# Patient Record
Sex: MALE | Race: WHITE | NOT HISPANIC OR LATINO
[De-identification: names, ages, dates, MRNs, and addresses within clinical notes are randomized per-mention and may not be internally consistent; named-entity substitution may affect disease eponyms.]

---

## 2019-08-13 PROBLEM — Z00.00 ENCOUNTER FOR PREVENTIVE HEALTH EXAMINATION: Status: ACTIVE | Noted: 2019-08-13

## 2019-08-14 ENCOUNTER — APPOINTMENT (OUTPATIENT)
Dept: CARDIOLOGY | Facility: CLINIC | Age: 54
End: 2019-08-14
Payer: COMMERCIAL

## 2019-08-14 VITALS
HEART RATE: 60 BPM | BODY MASS INDEX: 18.18 KG/M2 | WEIGHT: 127 LBS | DIASTOLIC BLOOD PRESSURE: 74 MMHG | HEIGHT: 70 IN | SYSTOLIC BLOOD PRESSURE: 124 MMHG

## 2019-08-14 DIAGNOSIS — I46.9 CARDIAC ARREST, CAUSE UNSPECIFIED: ICD-10-CM

## 2019-08-14 DIAGNOSIS — I49.8 OTHER SPECIFIED CARDIAC ARRHYTHMIAS: ICD-10-CM

## 2019-08-14 DIAGNOSIS — I42.2 OTHER HYPERTROPHIC CARDIOMYOPATHY: ICD-10-CM

## 2019-08-14 PROCEDURE — 93000 ELECTROCARDIOGRAM COMPLETE: CPT

## 2019-08-14 PROCEDURE — 99204 OFFICE O/P NEW MOD 45 MIN: CPT

## 2019-08-14 PROCEDURE — 96040: CPT

## 2019-08-15 ENCOUNTER — LABORATORY RESULT (OUTPATIENT)
Age: 54
End: 2019-08-15

## 2019-08-20 PROBLEM — I49.8 BRUGADA SYNDROME: Status: ACTIVE | Noted: 2019-08-20

## 2019-08-20 PROBLEM — I42.2 CARDIOMYOPATHY, HYPERTROPHIC: Status: ACTIVE | Noted: 2019-08-20

## 2019-08-20 PROBLEM — I46.9 CARDIAC ARREST: Status: ACTIVE | Noted: 2019-08-20

## 2019-08-20 NOTE — HISTORY OF PRESENT ILLNESS
[FreeTextEntry1] : Mr Abdias García chloe 54 yo M [facility] Brugada syndrome, HCM  and cardiac arrest.  He states he has had a murmur present since he was a child but was always an active child participating in sports without any difficulties.  in 7/2010 he had a syncopal episode, he describes it a a sudden LOC after standing, lasting seconds with quick return to his baseline.  It was witnessed by his family and he initially told the observing family member that he tripped.  He followed with a cardiologist who noticed an abnormal EKG and underwent an ECHO that demonstrated HCM with septal thickening.  He underwent stress testing and annual followup with stress testing without changes until 2018.  At that visit his EKG had changes and he was referred to an EP Dr Mercer at [facility].  He states here he was given a diagnosis of brugada syndrome.  He then underwent an EP study and had a cardiac arrest during the study, he was successfully resuscitated and had an ICD placed.  \par Today he presents for a cardiogenomic evaluation.

## 2019-08-20 NOTE — REASON FOR VISIT
[Family Member] : family member [FreeTextEntry3] : JYOTHI DANIEL  is being seen  for an initial consultation at the Cardiogenomics Program at [facility] on 08/14/2019.   Mr. DANIEL was referred by His cardiologist at [facility] Dr Mercer for hereditary cardiac predisposition risk assessment and counseling, due to his history of HCM, Brugada syndrome and cardiac arrest.

## 2019-08-20 NOTE — REVIEW OF SYSTEMS
[Nl] : Neurological [NI] : Endocrine [Wgt Loss (___ lbs)] : no recent weight loss [Wgt Gain (___ Lbs)] : no recent weight gain [Rash] : no rash [Birth Marks] : no birth marks [Muscle Weakness] : no muscle weakness [Eczema] : no eczema [Subluxation] : no subluxation was seen [Joint Dislocation] : no dislocation [Muscle Pain] : no muscle pain [Cyanosis] : no cyanosis [Edema] : no ~T edema [Chest Pain] : no chest pain [Diaphoresis] : no diaphoresis [Palpitations] : palpitations [Exercise Intolerance] : no exercise intolerance

## 2019-08-20 NOTE — PHYSICAL EXAM
[Alert] : alert [Active] : active [In no acute distress] :  in no acute distress [Well developed] : well developed [Interested in people and surroundings] : interested in people and surroundings [Well nourished] : well nourished [Normal] : orientated to person, place, and time [de-identified] : regular 2/6 fransisca, no change with valsalva [de-identified] : no edema [de-identified] : 5/5/ UARISTEO and ADDIE

## 2019-08-20 NOTE — FAMILY HISTORY
[FreeTextEntry1] : FamilyHistory_20_twCiteListControlStart FamilyHistory_20_twCiteListControlEnd [id number] \par A four-generation family history was constructed and scanned into [employer]. \par Family history is significant for: \par Both his maternal and paternal families originate from [place]\par No Ashkenazi [employer] ancestry. Family history was positive/negative for consanguinity  No family history of SIDS\par  \par \par

## 2019-10-02 ENCOUNTER — APPOINTMENT (OUTPATIENT)
Dept: CARDIOLOGY | Facility: CLINIC | Age: 54
End: 2019-10-02
Payer: COMMERCIAL

## 2019-10-02 VITALS — HEART RATE: 78 BPM | SYSTOLIC BLOOD PRESSURE: 157 MMHG | DIASTOLIC BLOOD PRESSURE: 82 MMHG | OXYGEN SATURATION: 98 %

## 2019-10-02 VITALS — SYSTOLIC BLOOD PRESSURE: 138 MMHG | DIASTOLIC BLOOD PRESSURE: 92 MMHG

## 2019-10-02 PROCEDURE — 96040: CPT

## 2019-10-02 PROCEDURE — 99214 OFFICE O/P EST MOD 30 MIN: CPT

## 2019-10-09 NOTE — PHYSICAL EXAM
[Alert] : alert [Active] : active [In no acute distress] :  in no acute distress [Well developed] : well developed [Well nourished] : well nourished [Interested in people and surroundings] : interested in people and surroundings [Normal] : orientated to person, place, and time [de-identified] : regular 2/6 fransisca, no change with valsalva [de-identified] : no edema [de-identified] : 5/5/ UARISTEO and ADDIE

## 2019-10-09 NOTE — HISTORY OF PRESENT ILLNESS
[FreeTextEntry1] : Mr Abdias García chloe 54 yo M [facility] Brugada syndrome, HCM  and cardiac arrest.  He states he has had a murmur present since he was a child but was always an active child participating in sports without any difficulties.  in 7/2010 he had a syncopal episode, he describes it a a sudden LOC after standing, lasting seconds with quick return to his baseline.  It was witnessed by his family and he initially told the observing family member that he tripped.  He followed with a cardiologist who noticed an abnormal EKG and underwent an ECHO that demonstrated HCM with septal thickening.  He underwent stress testing and annual followup with stress testing without changes until 2018.  At that visit his EKG had changes and he was referred to an EP Dr Mercer at [facility].  He states here he was given a diagnosis of brugada syndrome.  He then underwent an EP study and had a cardiac arrest during the study, he was successfully resuscitated and had an ICD placed.  \par \par He underwent a genetic evaluation with the  combined cardiac sequencing and Del/Dup panel 138 genes (#745)  \par  today he presents for results

## 2019-10-09 NOTE — REASON FOR VISIT
[Follow-Up] : [unfilled]  is being seen for  ~M a follow-up visit [Family Member] : family member [FreeTextEntry3] : Mr. JYOTHI DANIEL  is a 54 year  year-old man initially seen at the Cardiogenomics Program at [facility] on 8/14/19 due to his diagnosis of HCM , Brugada syndrome and cardiac arrest.  He underwent genetic testing and today on 10/02/2019 presents  for a discussion regarding genetic testing results\par He  was accompanied by his brother\par

## 2019-10-09 NOTE — REVIEW OF SYSTEMS
[Nl] : Genitourinary [NI] : Endocrine [Palpitations] : palpitations [Wgt Loss (___ lbs)] : no recent weight loss [Wgt Gain (___ Lbs)] : no recent weight gain [Rash] : no rash [Birth Marks] : no birth marks [Eczema] : no eczema [Muscle Weakness] : no muscle weakness [Subluxation] : no subluxation was seen [Joint Dislocation] : no dislocation [Muscle Pain] : no muscle pain [Cyanosis] : no cyanosis [Edema] : no ~T edema [Diaphoresis] : no diaphoresis [Chest Pain] : no chest pain [Exercise Intolerance] : no exercise intolerance

## 2019-10-09 NOTE — FAMILY HISTORY
[FreeTextEntry1] : A four-generation family history was constructed and scanned into [employer]. \par Family history is significant for a brother with HCM and long QT due to a KCNQ1 mutation\par Both his maternal and paternal families originate from [place]\par No Ashkenazi Judaism ancestry. Family history was positive/negative for consanguinity No family history of SIDS\par